# Patient Record
Sex: MALE | Race: WHITE
[De-identification: names, ages, dates, MRNs, and addresses within clinical notes are randomized per-mention and may not be internally consistent; named-entity substitution may affect disease eponyms.]

---
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
## 2023-09-19 ENCOUNTER — HOSPITAL ENCOUNTER (EMERGENCY)
Dept: HOSPITAL 75 - ER | Age: 23
Discharge: HOME | End: 2023-09-19
Payer: MEDICAID

## 2023-09-19 VITALS — SYSTOLIC BLOOD PRESSURE: 156 MMHG | DIASTOLIC BLOOD PRESSURE: 92 MMHG

## 2023-09-19 DIAGNOSIS — R00.0: ICD-10-CM

## 2023-09-19 DIAGNOSIS — B34.9: Primary | ICD-10-CM

## 2023-09-19 DIAGNOSIS — R50.9: ICD-10-CM

## 2023-09-19 DIAGNOSIS — F17.210: ICD-10-CM

## 2023-09-19 PROCEDURE — 99285 EMERGENCY DEPT VISIT HI MDM: CPT
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
